# Patient Record
Sex: MALE | Race: BLACK OR AFRICAN AMERICAN | NOT HISPANIC OR LATINO | URBAN - METROPOLITAN AREA
[De-identification: names, ages, dates, MRNs, and addresses within clinical notes are randomized per-mention and may not be internally consistent; named-entity substitution may affect disease eponyms.]

---

## 2019-05-25 ENCOUNTER — EMERGENCY (EMERGENCY)
Facility: HOSPITAL | Age: 59
LOS: 1 days | Discharge: HOME | End: 2019-05-25
Attending: EMERGENCY MEDICINE | Admitting: EMERGENCY MEDICINE
Payer: MEDICAID

## 2019-05-25 VITALS
RESPIRATION RATE: 18 BRPM | TEMPERATURE: 98 F | OXYGEN SATURATION: 99 % | DIASTOLIC BLOOD PRESSURE: 67 MMHG | SYSTOLIC BLOOD PRESSURE: 140 MMHG | HEART RATE: 76 BPM

## 2019-05-25 LAB
ALBUMIN SERPL ELPH-MCNC: 3.9 G/DL — SIGNIFICANT CHANGE UP (ref 3.3–5)
ALP SERPL-CCNC: 87 U/L — SIGNIFICANT CHANGE UP (ref 40–120)
ALT FLD-CCNC: 24 U/L — SIGNIFICANT CHANGE UP (ref 4–41)
ANION GAP SERPL CALC-SCNC: 13 MMO/L — SIGNIFICANT CHANGE UP (ref 7–14)
APAP SERPL-MCNC: < 15 UG/ML — LOW (ref 15–25)
AST SERPL-CCNC: 23 U/L — SIGNIFICANT CHANGE UP (ref 4–40)
BASOPHILS # BLD AUTO: 0.02 K/UL — SIGNIFICANT CHANGE UP (ref 0–0.2)
BASOPHILS NFR BLD AUTO: 0.5 % — SIGNIFICANT CHANGE UP (ref 0–2)
BILIRUB SERPL-MCNC: 0.3 MG/DL — SIGNIFICANT CHANGE UP (ref 0.2–1.2)
BUN SERPL-MCNC: 19 MG/DL — SIGNIFICANT CHANGE UP (ref 7–23)
CALCIUM SERPL-MCNC: 9 MG/DL — SIGNIFICANT CHANGE UP (ref 8.4–10.5)
CHLORIDE SERPL-SCNC: 104 MMOL/L — SIGNIFICANT CHANGE UP (ref 98–107)
CO2 SERPL-SCNC: 21 MMOL/L — LOW (ref 22–31)
CREAT SERPL-MCNC: 0.96 MG/DL — SIGNIFICANT CHANGE UP (ref 0.5–1.3)
EOSINOPHIL # BLD AUTO: 0.06 K/UL — SIGNIFICANT CHANGE UP (ref 0–0.5)
EOSINOPHIL NFR BLD AUTO: 1.6 % — SIGNIFICANT CHANGE UP (ref 0–6)
ETHANOL BLD-MCNC: < 10 MG/DL — SIGNIFICANT CHANGE UP
GLUCOSE SERPL-MCNC: 71 MG/DL — SIGNIFICANT CHANGE UP (ref 70–99)
HCT VFR BLD CALC: 34.1 % — LOW (ref 39–50)
HGB BLD-MCNC: 11.3 G/DL — LOW (ref 13–17)
IMM GRANULOCYTES NFR BLD AUTO: 0.3 % — SIGNIFICANT CHANGE UP (ref 0–1.5)
LYMPHOCYTES # BLD AUTO: 1.48 K/UL — SIGNIFICANT CHANGE UP (ref 1–3.3)
LYMPHOCYTES # BLD AUTO: 39.9 % — SIGNIFICANT CHANGE UP (ref 13–44)
MCHC RBC-ENTMCNC: 26.6 PG — LOW (ref 27–34)
MCHC RBC-ENTMCNC: 33.1 % — SIGNIFICANT CHANGE UP (ref 32–36)
MCV RBC AUTO: 80.2 FL — SIGNIFICANT CHANGE UP (ref 80–100)
MONOCYTES # BLD AUTO: 0.39 K/UL — SIGNIFICANT CHANGE UP (ref 0–0.9)
MONOCYTES NFR BLD AUTO: 10.5 % — SIGNIFICANT CHANGE UP (ref 2–14)
NEUTROPHILS # BLD AUTO: 1.75 K/UL — LOW (ref 1.8–7.4)
NEUTROPHILS NFR BLD AUTO: 47.2 % — SIGNIFICANT CHANGE UP (ref 43–77)
NRBC # FLD: 0 K/UL — SIGNIFICANT CHANGE UP (ref 0–0)
PLATELET # BLD AUTO: 392 K/UL — SIGNIFICANT CHANGE UP (ref 150–400)
PMV BLD: 9.5 FL — SIGNIFICANT CHANGE UP (ref 7–13)
POTASSIUM SERPL-MCNC: 4.8 MMOL/L — SIGNIFICANT CHANGE UP (ref 3.5–5.3)
POTASSIUM SERPL-SCNC: 4.8 MMOL/L — SIGNIFICANT CHANGE UP (ref 3.5–5.3)
PROT SERPL-MCNC: 6.6 G/DL — SIGNIFICANT CHANGE UP (ref 6–8.3)
RBC # BLD: 4.25 M/UL — SIGNIFICANT CHANGE UP (ref 4.2–5.8)
RBC # FLD: 16 % — HIGH (ref 10.3–14.5)
SALICYLATES SERPL-MCNC: < 5 MG/DL — LOW (ref 15–30)
SODIUM SERPL-SCNC: 138 MMOL/L — SIGNIFICANT CHANGE UP (ref 135–145)
TSH SERPL-MCNC: 1.38 UIU/ML — SIGNIFICANT CHANGE UP (ref 0.27–4.2)
WBC # BLD: 3.71 K/UL — LOW (ref 3.8–10.5)
WBC # FLD AUTO: 3.71 K/UL — LOW (ref 3.8–10.5)

## 2019-05-25 PROCEDURE — 71046 X-RAY EXAM CHEST 2 VIEWS: CPT | Mod: 26

## 2019-05-25 PROCEDURE — 99284 EMERGENCY DEPT VISIT MOD MDM: CPT

## 2019-05-25 NOTE — PROVIDER CONTACT NOTE (OTHER) - SITUATION
Met with pt re: shelter.  Educated pt about shelter referral; pt refusing shelter referral. Met with pt re: shelter.  Educated pt about shelter referral; pt refusing shelter referral.  Called [facility] re: pt returning to program.

## 2019-05-25 NOTE — ED PROVIDER NOTE - PHYSICAL EXAMINATION
General: Patient awake alert NAD.   HEENT: normocephalic, atraumatic, EMOI, no scleral icterus.   Cardiac: RRR, S1, S2, no murmur, rubs, gallop.   LUNGS: CTA B/L no wheeze, rhonchi, rales.   Abdomen: soft NT, ND, no rebound no guarding, no CVA tenderness.   EXT: Moving all extremities, no edema.    Neuro: A&Ox3, gait normal, no focal neurological deficits, CN 2-12 grossly intact  Skin: warm, dry, no rash, no lesions

## 2019-05-25 NOTE — PROVIDER CONTACT NOTE (OTHER) - SITUATION
Met with pt again-informed him that respite is not an option.  Pt refusing shelter.  Stated he would go to various places to stay-motel, park, storage unit.

## 2019-05-25 NOTE — ED PROVIDER NOTE - CLINICAL SUMMARY MEDICAL DECISION MAKING FREE TEXT BOX
Lauren: 58 M, ADD, depression, anxiety, discharged from [facility] today, took his own Xanax 1 mg, then felt "woozy," tired, and confused. Says it's hard to take a deep breath. Seems unsteady on feet. Lauren: 58 M, ADD, depression, anxiety, discharged from a respite center today b/c was difficult toward other residents there, took his own Xanax 4-5 mg (to decrease his HR from recent crystal meth), then felt "woozy," tired, and confused. Unsteady on feet. Coherent. Has plans for a place to go. Await until Xanax wears off. Then dc.

## 2019-05-25 NOTE — ED ADULT NURSE NOTE - OBJECTIVE STATEMENT
break coverage rn: pt received to room 9 pt comes to ED by EMS from [facility] for confusion and being tired after taking 10 xanax  to relax pt is a&0x3 . pt denies SI/HI pt denies taking the pills to kill himself. pt has a 20 g in R AC by EMS pt VSS NAD labs were drawn and sent EDMD at bedside for eval awaiting further orders Will continue to monitor the pt

## 2019-05-25 NOTE — ED PROVIDER NOTE - ATTENDING CONTRIBUTION TO CARE
I performed a face-to-face evaluation of the patient and performed a history and physical examination. I agree with the history and physical examination.    Lauren: 58 M, ADD, depression, anxiety, discharged from a respite center today b/c was difficult toward other residents there, took his own Xanax 4-5 mg (to decrease his HR from recent crystal meth), then felt "woozy," tired, and confused. Unsteady on feet. Coherent. Has plans for a place to go. Await until Xanax wears off. Then dc.

## 2019-05-25 NOTE — PROVIDER CONTACT NOTE (OTHER) - ASSESSMENT
Medical team referred this case as pt has been medically cleared for discharge. Pt is 59 y/o male. Writer discussed the case with medical team and then spoke with the pt in ED # 9. pt stated “ I have no place to go but don’t want to go to any shelter ”. However pt adamantly refused to  to any shelter and requested to leave him alone. Writer educated the pt about [facility]’s assessment shelter [facility]. Address - [phone] ([address]), [address]: [address]. Pt was also educated and encouraged the pt go [facility]’s assessment shelter for safety concern and his own wellbeing. Pt is alert, ambulatory and oriented X4. Pt denies suicidal or homicidal thoughts during this time of discharge. Medical team was made aware and in agreement. All questions or concerns were addressed with Pt to satisfaction, No further SW intervention needed at this time for this visit.
SW contacted [facility] at [phone] to arrange transport.  Transport requested with [employer] at [phone].  Trip ID # [id number].

## 2019-05-25 NOTE — ED ADULT NURSE NOTE - CAS EDN DISCHARGE ASSESSMENT
Awake/pt ambulatory with steady gait, AAOx3, with all belongings in his possession at time of dc./Alert and oriented to person, place and time

## 2019-05-25 NOTE — ED ADULT NURSE NOTE - NSIMPLEMENTINTERV_GEN_ALL_ED
Implemented All Universal Safety Interventions:  [place] to call system. Call bell, personal items and telephone within reach. Instruct patient to call for assistance. Room bathroom lighting operational. Non-slip footwear when patient is off stretcher. Physically safe environment: no spills, clutter or unnecessary equipment. Stretcher in lowest position, wheels locked, appropriate side rails in place.

## 2019-05-25 NOTE — ED PROVIDER NOTE - NS ED ROS FT
GENERAL: No fever, chills  EYES: no vision changes, no discharge.   HEENT: no difficulty swallowing or speaking   CARDIAC: no chest pain/pressure, SOB, + palpitations,   PULMONARY: no cough, SOB  GI: no abdominal pain, n/v/d/c  : no dysuria, frequency, hematuria  SKIN: no rashes, lesions, vesicles  NEURO: no headache, lightheadedness.   MSK: No joint pain.
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
## 2019-05-25 NOTE — ED PROVIDER NOTE - OBJECTIVE STATEMENT
58M Pmhx of ADHD, Anxiety, Depression, poor historian, [facility] pw weakness. Pt endorse taking crystal meth 1-2 days ago and was still feeling palpitations today so took xanax 3-5 mgs. Endorse he feels unable to take full breath, no chest pain or pain anywhere else. PT states he was being discharged from [facility] today and his plan was to stay in his storage unit until he is scheduled to go to [place] in 3 days. 58M Pmhx of ADHD, Anxiety, Depression, coming from [facility] (facility at [facility]), pw weakness. Pt endorse taking crystal meth 1-2 days ago and was still feeling palpitations today so took xanax 3-5 mgs. Endorse he feels unable to take full breath, no chest pain or pain anywhere else. PT states he was being discharged from [facility] today and his plan was to stay in his storage unit until he is scheduled to go to [place] in 3 days. Unable to obtain further history.

## 2019-05-25 NOTE — ED ADULT TRIAGE NOTE - CHIEF COMPLAINT QUOTE
Patient brought to ER from [place] by EMS. Pt was being discharged from the facility and as per staff started to act confused. Pt admits to taking 4 Xanax last night and 1 today with no suicidal intention. Pt is alert and oriented times three and easily arousable however he appears sleepy. IVL to left AC 20 gauge and NS infusing.

## 2019-05-25 NOTE — PROVIDER CONTACT NOTE (OTHER) - BACKGROUND
ED SW note
Pt then states he will stay at a friend's place at [address] [place], [place] and is requesting transport to address.
As per Ese, pt was d/c today with plans for going to a rehab facility.  She reports that facility name is unknown.  She also states that pt is unable to return to respite for another 30 days.

## 2019-05-25 NOTE — ED ADULT NURSE REASSESSMENT NOTE - NS ED NURSE REASSESS COMMENT FT1
report received from DANIS Dillard, pt VS as noted, in NAD, resting comfortably, awaiting dispo, will continue to monitor pt.

## 2019-05-25 NOTE — ED ADULT NURSE NOTE - CHIEF COMPLAINT QUOTE
Patient brought to ER from [place] by EMS. Pt was being discharged from the facility and as per staff started to act confused. Pt admits to taking 4 Xanax last night and 1 today with no suicidal intention. Pt is alert and oriented times three and easily arousable however he appears sleepy. IVL to left AC 20 gauge and NS infusing.

## 2019-05-25 NOTE — ED PROVIDER NOTE - NSFOLLOWUPINSTRUCTIONS_ED_ALL_ED_FT
You were seen at [facility] ER for over dose of Xanax and you then sobered up. You had normal lab work.     Please refrain from over using Xanax especially with other drugs like alcohol or opiates.     Please follow-up with your primary care doctor in the next week.     You should follow-up at the [facility] in the next 1-2 weeks for further follow-up of care. Please call [phone] or [phone] for an appointment.     Please return for any other concerns or symptoms.

## 2019-05-26 VITALS
RESPIRATION RATE: 16 BRPM | OXYGEN SATURATION: 100 % | HEART RATE: 68 BPM | DIASTOLIC BLOOD PRESSURE: 93 MMHG | TEMPERATURE: 97 F | SYSTOLIC BLOOD PRESSURE: 131 MMHG

## 2019-07-26 VITALS
HEART RATE: 76 BPM | OXYGEN SATURATION: 100 % | RESPIRATION RATE: 18 BRPM | SYSTOLIC BLOOD PRESSURE: 107 MMHG | WEIGHT: 160.06 LBS | HEIGHT: 67 IN | DIASTOLIC BLOOD PRESSURE: 64 MMHG | TEMPERATURE: 98 F

## 2019-07-27 ENCOUNTER — INPATIENT (INPATIENT)
Facility: HOSPITAL | Age: 59
LOS: 2 days | Discharge: HOME | DRG: 885 | End: 2019-07-30
Attending: PSYCHIATRY & NEUROLOGY | Admitting: PSYCHIATRY & NEUROLOGY
Payer: MEDICAID

## 2019-07-27 DIAGNOSIS — F33.2 MAJOR DEPRESSIVE DISORDER, RECURRENT SEVERE WITHOUT PSYCHOTIC FEATURES: ICD-10-CM

## 2019-07-27 DIAGNOSIS — F43.10 POST-TRAUMATIC STRESS DISORDER, UNSPECIFIED: ICD-10-CM

## 2019-07-27 DIAGNOSIS — F15.10 OTHER STIMULANT ABUSE, UNCOMPLICATED: ICD-10-CM

## 2019-07-27 DIAGNOSIS — F41.9 ANXIETY DISORDER, UNSPECIFIED: ICD-10-CM

## 2019-07-27 DIAGNOSIS — R69 ILLNESS, UNSPECIFIED: ICD-10-CM

## 2019-07-27 PROBLEM — F32.9 MAJOR DEPRESSIVE DISORDER, SINGLE EPISODE, UNSPECIFIED: Chronic | Status: ACTIVE | Noted: 2019-05-25

## 2019-07-27 PROBLEM — F19.10 OTHER PSYCHOACTIVE SUBSTANCE ABUSE, UNCOMPLICATED: Chronic | Status: ACTIVE | Noted: 2019-05-25

## 2019-07-27 PROBLEM — F90.9 ATTENTION-DEFICIT HYPERACTIVITY DISORDER, UNSPECIFIED TYPE: Chronic | Status: ACTIVE | Noted: 2019-05-25

## 2019-07-27 LAB
ALBUMIN SERPL ELPH-MCNC: 4 G/DL — SIGNIFICANT CHANGE UP (ref 3.3–5)
ALP SERPL-CCNC: 94 U/L — SIGNIFICANT CHANGE UP (ref 40–120)
ALT FLD-CCNC: 18 U/L — SIGNIFICANT CHANGE UP (ref 10–45)
ANION GAP SERPL CALC-SCNC: 10 MMOL/L — SIGNIFICANT CHANGE UP (ref 5–17)
APAP SERPL-MCNC: <5 UG/ML — LOW (ref 10–30)
APPEARANCE UR: CLEAR — SIGNIFICANT CHANGE UP
AST SERPL-CCNC: 25 U/L — SIGNIFICANT CHANGE UP (ref 10–40)
BASOPHILS # BLD AUTO: 0.03 K/UL — SIGNIFICANT CHANGE UP (ref 0–0.2)
BASOPHILS NFR BLD AUTO: 0.5 % — SIGNIFICANT CHANGE UP (ref 0–2)
BILIRUB SERPL-MCNC: 0.2 MG/DL — SIGNIFICANT CHANGE UP (ref 0.2–1.2)
BILIRUB UR-MCNC: NEGATIVE — SIGNIFICANT CHANGE UP
BUN SERPL-MCNC: 21 MG/DL — SIGNIFICANT CHANGE UP (ref 7–23)
CALCIUM SERPL-MCNC: 9 MG/DL — SIGNIFICANT CHANGE UP (ref 8.4–10.5)
CHLORIDE SERPL-SCNC: 106 MMOL/L — SIGNIFICANT CHANGE UP (ref 96–108)
CO2 SERPL-SCNC: 25 MMOL/L — SIGNIFICANT CHANGE UP (ref 22–31)
COLOR SPEC: YELLOW — SIGNIFICANT CHANGE UP
CREAT SERPL-MCNC: 0.91 MG/DL — SIGNIFICANT CHANGE UP (ref 0.5–1.3)
DIFF PNL FLD: NEGATIVE — SIGNIFICANT CHANGE UP
EOSINOPHIL # BLD AUTO: 0.15 K/UL — SIGNIFICANT CHANGE UP (ref 0–0.5)
EOSINOPHIL NFR BLD AUTO: 2.7 % — SIGNIFICANT CHANGE UP (ref 0–6)
ETHANOL SERPL-MCNC: <10 MG/DL — SIGNIFICANT CHANGE UP (ref 0–10)
GLUCOSE SERPL-MCNC: 104 MG/DL — HIGH (ref 70–99)
GLUCOSE UR QL: NEGATIVE — SIGNIFICANT CHANGE UP
HCT VFR BLD CALC: 38.9 % — LOW (ref 39–50)
HGB BLD-MCNC: 11.9 G/DL — LOW (ref 13–17)
IMM GRANULOCYTES NFR BLD AUTO: 0.4 % — SIGNIFICANT CHANGE UP (ref 0–1.5)
KETONES UR-MCNC: ABNORMAL MG/DL
LEUKOCYTE ESTERASE UR-ACNC: NEGATIVE — SIGNIFICANT CHANGE UP
LYMPHOCYTES # BLD AUTO: 1.86 K/UL — SIGNIFICANT CHANGE UP (ref 1–3.3)
LYMPHOCYTES # BLD AUTO: 33.3 % — SIGNIFICANT CHANGE UP (ref 13–44)
MCHC RBC-ENTMCNC: 26 PG — LOW (ref 27–34)
MCHC RBC-ENTMCNC: 30.6 GM/DL — LOW (ref 32–36)
MCV RBC AUTO: 85.1 FL — SIGNIFICANT CHANGE UP (ref 80–100)
MONOCYTES # BLD AUTO: 0.72 K/UL — SIGNIFICANT CHANGE UP (ref 0–0.9)
MONOCYTES NFR BLD AUTO: 12.9 % — SIGNIFICANT CHANGE UP (ref 2–14)
NEUTROPHILS # BLD AUTO: 2.8 K/UL — SIGNIFICANT CHANGE UP (ref 1.8–7.4)
NEUTROPHILS NFR BLD AUTO: 50.2 % — SIGNIFICANT CHANGE UP (ref 43–77)
NITRITE UR-MCNC: NEGATIVE — SIGNIFICANT CHANGE UP
NRBC # BLD: 0 /100 WBCS — SIGNIFICANT CHANGE UP (ref 0–0)
PCP SPEC-MCNC: SIGNIFICANT CHANGE UP
PH UR: 6 — SIGNIFICANT CHANGE UP (ref 5–8)
PLATELET # BLD AUTO: 392 K/UL — SIGNIFICANT CHANGE UP (ref 150–400)
POTASSIUM SERPL-MCNC: 4.2 MMOL/L — SIGNIFICANT CHANGE UP (ref 3.5–5.3)
POTASSIUM SERPL-SCNC: 4.2 MMOL/L — SIGNIFICANT CHANGE UP (ref 3.5–5.3)
PROT SERPL-MCNC: 7.1 G/DL — SIGNIFICANT CHANGE UP (ref 6–8.3)
PROT UR-MCNC: NEGATIVE MG/DL — SIGNIFICANT CHANGE UP
RBC # BLD: 4.57 M/UL — SIGNIFICANT CHANGE UP (ref 4.2–5.8)
RBC # FLD: 17 % — HIGH (ref 10.3–14.5)
SALICYLATES SERPL-MCNC: <0.3 MG/DL — LOW (ref 2.8–20)
SODIUM SERPL-SCNC: 141 MMOL/L — SIGNIFICANT CHANGE UP (ref 135–145)
SP GR SPEC: 1.02 — SIGNIFICANT CHANGE UP (ref 1–1.03)
UROBILINOGEN FLD QL: 0.2 E.U./DL — SIGNIFICANT CHANGE UP
WBC # BLD: 5.58 K/UL — SIGNIFICANT CHANGE UP (ref 3.8–10.5)
WBC # FLD AUTO: 5.58 K/UL — SIGNIFICANT CHANGE UP (ref 3.8–10.5)

## 2019-07-27 PROCEDURE — 90792 PSYCH DIAG EVAL W/MED SRVCS: CPT | Mod: GT

## 2019-07-27 PROCEDURE — 99285 EMERGENCY DEPT VISIT HI MDM: CPT | Mod: 25

## 2019-07-27 PROCEDURE — G0427: CPT | Mod: GT

## 2019-07-27 PROCEDURE — 93010 ELECTROCARDIOGRAM REPORT: CPT

## 2019-07-27 RX ORDER — LISINOPRIL 2.5 MG/1
10 TABLET ORAL DAILY
Refills: 0 | Status: DISCONTINUED | OUTPATIENT
Start: 2019-07-27 | End: 2019-07-30

## 2019-07-27 RX ORDER — DIPHENHYDRAMINE HCL 50 MG
50 CAPSULE ORAL ONCE
Refills: 0 | Status: DISCONTINUED | OUTPATIENT
Start: 2019-07-27 | End: 2019-07-30

## 2019-07-27 RX ORDER — HALOPERIDOL DECANOATE 100 MG/ML
5 INJECTION INTRAMUSCULAR EVERY 8 HOURS
Refills: 0 | Status: DISCONTINUED | OUTPATIENT
Start: 2019-07-27 | End: 2019-07-30

## 2019-07-27 RX ORDER — HALOPERIDOL DECANOATE 100 MG/ML
5 INJECTION INTRAMUSCULAR ONCE
Refills: 0 | Status: DISCONTINUED | OUTPATIENT
Start: 2019-07-27 | End: 2019-07-30

## 2019-07-27 RX ORDER — FLUOXETINE HCL 10 MG
10 CAPSULE ORAL DAILY
Refills: 0 | Status: DISCONTINUED | OUTPATIENT
Start: 2019-07-27 | End: 2019-07-30

## 2019-07-27 RX ORDER — DIPHENHYDRAMINE HCL 50 MG
50 CAPSULE ORAL EVERY 6 HOURS
Refills: 0 | Status: DISCONTINUED | OUTPATIENT
Start: 2019-07-27 | End: 2019-07-30

## 2019-07-27 RX ADMIN — HALOPERIDOL DECANOATE 5 MILLIGRAM(S): 100 INJECTION INTRAMUSCULAR at 23:38

## 2019-07-27 RX ADMIN — Medication 50 MILLIGRAM(S): at 23:38

## 2019-07-27 NOTE — ED PROVIDER NOTE - ATTENDING CONTRIBUTION TO CARE
59 y/o M with PMHx of anxiety and ADHD presents to ER with complaints of feeling depressed and anxious for the last week. Patient reports that he can't sleep or eat, admits being non-complaint with medications. Also admits being admitted to hospital for overdosing on Xanax. Denies suicidal ideations or attempts. States that depression is sometimes "so overwhelming that he may become suicidal". Denies hearing voices, drinking, using street drugs, or any other acute complaints.    Agree with above HPI and PE  Psych clearance labs in ED  Pt seen and evaluated by psych who feels pt requires admission

## 2019-07-27 NOTE — ED BEHAVIORAL HEALTH ASSESSMENT NOTE - RISK ASSESSMENT
The patient is at acutely elevated risk for suicide. His risk factors include male gender,  marital status, limited social supports, undomiciled, current mood episode, recent suicidality, hopelessness, insomnia, anxiety, and active substance use. His protective factors include no acute suicidal ideation/intent/plan, no prior suicide attempts or history of non-suicidal self-injury, and no known direct access to firearms.    Acute Suicide Risk  (  ) High   ( X ) Moderate   (  ) Low   (  ) Unable to determine   Rationale: Please see above     Elevated Chronic Risk   ( X ) Yes ___________  Details: Please see above  (  ) No   ___________     Safety Plan   Details: The patient will be psychiatrically admitted to [facility].
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
## 2019-07-27 NOTE — ED PROVIDER NOTE - CLINICAL SUMMARY MEDICAL DECISION MAKING FREE TEXT BOX
57 yo male, homeless, with h/o anxiety, ADHD, PTSD, depression, in the ER due to worsening of his symptoms, admits being non-compliant with medications. pt seen and evaluated by tele-psychiatrist, admission for further management recommended. medically clear for admission.
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
## 2019-07-27 NOTE — ED BEHAVIORAL HEALTH ASSESSMENT NOTE - PRIMARY DX
Severe episode of recurrent major depressive disorder, without psychotic features Deferred diagnosis on axis II

## 2019-07-27 NOTE — ED PROVIDER NOTE - OBJECTIVE STATEMENT
59 y/o M with PMHx of anxiety and ADHD presents to ER with complaints of feeling depressed and anxious for the last week. Patient reports that he can't sleep or eat, admits being non-complaint with medications. Also admits being admitted to hospital for overdosing on Xanax. Denies suicidal ideations or attempts. States that depression is sometimes "so overwhelming that he may become suicidal". Denies hearing voices, drinking, using street drugs, or any other acute complaints.

## 2019-07-27 NOTE — ED BEHAVIORAL HEALTH NOTE - BEHAVIORAL HEALTH NOTE
Consult requested by EM Attending Dr. Licona at 2:43am. Telepsychiatry attempted to consult at 2:51am but unable to initiate due to patient not being on arm-length 1:1. ED staff educated to notify Telepsychiatry once 1:1 staff member is available.
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
## 2019-07-27 NOTE — ED BEHAVIORAL HEALTH ASSESSMENT NOTE - SUICIDE RISK FACTORS
Substance abuse/dependence/History of abuse/trauma/Global insomnia/Anhedonia/Hopelessness/Mood episode

## 2019-07-27 NOTE — ED BEHAVIORAL HEALTH ASSESSMENT NOTE - DESCRIPTION
Vital Signs Last 24 Hrs  T(C): 36.6 (26 Jul 2019 23:23), Max: 36.6 (26 Jul 2019 23:23)  T(F): 97.9 (26 Jul 2019 23:23), Max: 97.9 (26 Jul 2019 23:23)  HR: 76 (26 Jul 2019 23:23) (76 - 76)  BP: 107/64 (26 Jul 2019 23:23) (107/64 - 107/64)  BP(mean): --  RR: 18 (26 Jul 2019 23:23) (18 - 18)  SpO2: 100% (26 Jul 2019 23:23) (100% - 100%)    Pt in ED for ~2 hrs prior to Telepsych consult at 2:43. Per Triage RN, provider (verbally conveyed to primary RN) that Pt arrived at ~ 0:58 dressed appropriately for the weather, with fair hygiene/grooming. Pt was compliant with good cooperation for entire process of wanding/search/ and gowning and was escorted to the  area with no issues. Nothing of note in pt’s belongings. RN reports patient provided both urine specimen and routine bloodwork willingly. Pt requested food and drink. As per RN, pt. has not been agitated or irritable during his stay.  Pt’s eye contact is good, his speech volume/rate/articulation are normal and clear.  Pt’s affect is depressed and his thought process is logical and linear.  Pt. reports to RN/provider that he is depressed about finances and his living situation (pt. is homeless). Pt denies SI/HI and any delusions. RN states he is A/Ox 4 and does not appear cognitively impaired. Per RN, pt. has not required psychiatric or medical medications or any security interventions. Pt has no visitors at bedside. Pt is placed on a 1:1 observation and in a private room ready for telepsychiatry evaluation. HTN The patient reports that he is currently homeless living on the streets. He is  and does not have any children. The patient is currently unemployed.

## 2019-07-27 NOTE — ED BEHAVIORAL HEALTH ASSESSMENT NOTE - PSYCHIATRIC ISSUES AND PLAN (INCLUDE STANDING AND PRN MEDICATION)
Will start the patient on Prozac 10mg PO daily with plans to titrate the dose of this medication upward as clinically indicated and tolerated. Haldol/Benadryl PRNs for agitation as per [place].
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
## 2019-07-28 RX ADMIN — Medication 50 MILLIGRAM(S): at 21:20

## 2019-07-28 RX ADMIN — LISINOPRIL 10 MILLIGRAM(S): 2.5 TABLET ORAL at 13:07

## 2019-07-28 RX ADMIN — HALOPERIDOL DECANOATE 5 MILLIGRAM(S): 100 INJECTION INTRAMUSCULAR at 21:20
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
## 2019-07-28 NOTE — BEHAVIORAL HEALTH ASSESSMENT NOTE - RISK ASSESSMENT
The patient is at acutely elevated risk for suicide. His risk factors include male gender,  marital status, limited social supports, undomiciled, current mood episode, recent suicidality, hopelessness, insomnia, anxiety, and active substance use. His protective factors include no acute suicidal ideation/intent/plan, no prior suicide attempts or history of non-suicidal self-injury, and no known direct access to firearms.    Acute Suicide Risk  (  ) High   ( X ) Moderate   (  ) Low   (  ) Unable to determine   Rationale: Please see above     Elevated Chronic Risk   ( X ) Yes ___________  Details: Please see above  (  ) No   ___________     Safety Plan   Details: The patient will be psychiatrically admitted to [facility].
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
## 2019-07-28 NOTE — BEHAVIORAL HEALTH ASSESSMENT NOTE - GAIT / STATION
Normal gait / station Alternatives Discussed Intro (Do Not Add Period): I discussed alternative treatments to Mohs surgery and specifically discussed the risks and benefits of
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
## 2019-07-29 DIAGNOSIS — F19.94 OTHER PSYCHOACTIVE SUBSTANCE USE, UNSPECIFIED WITH PSYCHOACTIVE SUBSTANCE-INDUCED MOOD DISORDER: ICD-10-CM

## 2019-07-29 DIAGNOSIS — F32.0 MAJOR DEPRESSIVE DISORDER, SINGLE EPISODE, MILD: ICD-10-CM

## 2019-07-29 PROCEDURE — 99232 SBSQ HOSP IP/OBS MODERATE 35: CPT

## 2019-07-29 RX ADMIN — LISINOPRIL 10 MILLIGRAM(S): 2.5 TABLET ORAL at 11:11

## 2019-07-29 RX ADMIN — HALOPERIDOL DECANOATE 5 MILLIGRAM(S): 100 INJECTION INTRAMUSCULAR at 21:31

## 2019-07-29 RX ADMIN — Medication 50 MILLIGRAM(S): at 21:31

## 2019-07-29 NOTE — PROGRESS NOTE BEHAVIORAL HEALTH - NSBHFUPADDHPIFT_PSY_A_CORE
Patient is a 59 y/o AA male, undomiciled, single, on SSI, with previous history of ADHD, depression, anxiety (currently taking adderall and ambien), crystal meth use, no previous psychiatric hospitalizations, presenting for worsening symptoms of depression and anxiety. Upon evaluation today with writer, patient reports that he was recently kicked out of the storage unit he was living in with his possessions and had no money to pay the balance due to not being paid for recent work, which worsened his anxiety/depression and brought him to the ED. Patient reports very little sleep for the past 3-4 days, denies SI/HI, denies A/V hallucinations. Patient says his possessions will be auctioned off by the storage center on Tuesday 7/30 if he does not go to try and settle the balance, which he has money for now through SSI.

## 2019-07-29 NOTE — PROGRESS NOTE BEHAVIORAL HEALTH - NSBHCHARTREVIEWVS_PSY_A_CORE FT
Vital Signs Last 24 Hrs  T(C): 36.8 (29 Jul 2019 06:49), Max: 36.9 (28 Jul 2019 21:00)  T(F): 98.3 (29 Jul 2019 06:49), Max: 98.5 (28 Jul 2019 21:00)  HR: 64 (29 Jul 2019 06:49) (56 - 74)  BP: 147/75 (29 Jul 2019 06:49) (113/54 - 147/75)  BP(mean): --  RR: 17 (28 Jul 2019 21:00) (17 - 17)  SpO2: 100% (29 Jul 2019 06:49) (98% - 100%) Vital Signs Last 24 Hrs  T(C): 36.7 (29 Jul 2019 11:27), Max: 36.9 (28 Jul 2019 21:00)  T(F): 98 (29 Jul 2019 11:27), Max: 98.5 (28 Jul 2019 21:00)  HR: 65 (29 Jul 2019 11:27) (56 - 74)  BP: 126/82 (29 Jul 2019 11:27) (113/54 - 147/75)  BP(mean): --  RR: 19 (29 Jul 2019 11:27) (17 - 19)  SpO2: 98% (29 Jul 2019 11:27) (98% - 100%)

## 2019-07-29 NOTE — PROGRESS NOTE BEHAVIORAL HEALTH - NSBHADDHXPSYCHFT_PSY_A_CORE
No self reported hx of hospitalizations. Reports taking adderall and ambien on/off especially during periods of work as patient freelances as a . Patient claims to have appointment at a psychiatric clinic on [address] on 8/5/19.

## 2019-07-29 NOTE — PROGRESS NOTE BEHAVIORAL HEALTH - NSBHADDHXSUBSTFT_PSY_A_CORE
Crystal meth, last used 2 months ago. Recently discharged from [facility] this month (July 2019).

## 2019-07-29 NOTE — PROGRESS NOTE BEHAVIORAL HEALTH - SUMMARY
Patient is a 57 y/o AA male, undomiciled, single, on SSI, with previous history of ADHD, depression, anxiety (currently taking adderall and ambien), crystal meth use, no previous psychiatric hospitalizations, presenting for worsening symptoms of depression and anxiety. Upon evaluation, patient's mental status is largely unremarkable for any concerning features besides depressed mood. Patient otherwise appears future-oriented as he is concerned for his belongings and has normal reasoning and linear thought process about how to address his issues outside of the hospital. Patient reports using adderall and ambien as needed, especially during periods of work to meet deadlines, and recently worked on a painting project that he was not paid for which caused a significant amount of psychosocial stress. UTox is positive for amphetamines, either adderall or crystal meth though patient denies use for past 2 months, either substance likely exacerbated mood symptoms. 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 Patient is a 59 y/o AA male, undomiciled, single, on SSI, with previous history of ADHD, depression, anxiety (currently taking adderall and ambien), crystal meth use, no previous psychiatric hospitalizations, presenting for worsening symptoms of depression and anxiety. Upon evaluation, patient's mental status is largely unremarkable for any concerning features besides depressed mood. Patient otherwise appears future-oriented as he is concerned for his belongings and has normal reasoning and linear thought process about how to address his issues outside of the hospital. Patient reports using Adderall and ambien as needed, especially during periods of work to meet deadlines, and recently worked on a painting project that he was not paid for which caused a significant amount of psychosocial stress. UTox is positive for amphetamines, either Adderall or crystal meth though patient denies use for past 2 months, either substance likely exacerbated mood symptoms. 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

## 2019-07-29 NOTE — PROGRESS NOTE BEHAVIORAL HEALTH - NSBHADMITIPREASON_PSY_A_CORE
though patient denied SI, he reported feeling unsafe to leave the hospital/Danger to self; mental illness expected to respond to inpatient care

## 2019-07-29 NOTE — PROGRESS NOTE BEHAVIORAL HEALTH - NSBHFUPSTRENGTHS_PSY_A_CORE
Motivated and ready for change/Attempting to realize his/her potential/Intelligent/Creative/Able to manage surrounding demands/opportunities/Has vocational interests or hobbies/Cooperative with treatment/In good physical health/Good impulse control/Able to set and pursue goals

## 2019-07-30 VITALS
DIASTOLIC BLOOD PRESSURE: 74 MMHG | SYSTOLIC BLOOD PRESSURE: 119 MMHG | TEMPERATURE: 98 F | OXYGEN SATURATION: 96 % | RESPIRATION RATE: 18 BRPM | HEART RATE: 62 BPM

## 2019-07-30 LAB
CHOLEST SERPL-MCNC: 158 MG/DL — SIGNIFICANT CHANGE UP (ref 10–199)
HBA1C BLD-MCNC: 5.9 % — HIGH (ref 4–5.6)
HDLC SERPL-MCNC: 53 MG/DL — SIGNIFICANT CHANGE UP
LIPID PNL WITH DIRECT LDL SERPL: 92 MG/DL — SIGNIFICANT CHANGE UP
TOTAL CHOLESTEROL/HDL RATIO MEASUREMENT: 3 RATIO — LOW (ref 3.4–9.6)
TRIGL SERPL-MCNC: 64 MG/DL — SIGNIFICANT CHANGE UP (ref 10–149)

## 2019-07-30 PROCEDURE — 36415 COLL VENOUS BLD VENIPUNCTURE: CPT

## 2019-07-30 PROCEDURE — 83036 HEMOGLOBIN GLYCOSYLATED A1C: CPT

## 2019-07-30 PROCEDURE — 80307 DRUG TEST PRSMV CHEM ANLYZR: CPT

## 2019-07-30 PROCEDURE — 99285 EMERGENCY DEPT VISIT HI MDM: CPT | Mod: 25

## 2019-07-30 PROCEDURE — 93005 ELECTROCARDIOGRAM TRACING: CPT

## 2019-07-30 PROCEDURE — 80053 COMPREHEN METABOLIC PANEL: CPT

## 2019-07-30 PROCEDURE — 80061 LIPID PANEL: CPT

## 2019-07-30 PROCEDURE — 81003 URINALYSIS AUTO W/O SCOPE: CPT

## 2019-07-30 PROCEDURE — 85025 COMPLETE CBC W/AUTO DIFF WBC: CPT

## 2019-07-30 RX ADMIN — LISINOPRIL 10 MILLIGRAM(S): 2.5 TABLET ORAL at 10:02

## 2019-07-30 NOTE — PROGRESS NOTE BEHAVIORAL HEALTH - NSBHADMITIPREASON_PSY_A_CORE
Danger to self; mental illness expected to respond to inpatient care/though patient denied SI, he reported feeling unsafe to leave the hospital other reason

## 2019-07-30 NOTE — PROGRESS NOTE BEHAVIORAL HEALTH - PRN MEDICATIONS SINCE LAST EVAL
Called number and went straight to voicemail. Advised pt to call back if concerns. No return page received.
no
yes
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
## 2019-07-30 NOTE — PROGRESS NOTE BEHAVIORAL HEALTH - NSBHFUPINTERVALHXFT_PSY_A_CORE
Patient seen by writer. Patient continues to deny SI/HI, A/V hallucinations. Pt is future-oriented, concerned about retaining his belongings and getting paid for recent art work he had done. Patient is stable and ready for discharge.
Patient seen by writer. Patient denies SI/HI, A/V hallucinations, patient confirmed he did not present with any SI/HI in the ED. Patient reports symptomatic improvement in his mood. Denies any symptoms of anneliese with poor sleep for 3-4 days.

## 2019-07-30 NOTE — PROGRESS NOTE BEHAVIORAL HEALTH - PROBLEM SELECTOR PLAN 2
-patient refusing Prozac, symptoms alleviated without pharmacological intervention
-patient refusing Prozac, symptoms alleviated without pharmacological intervention

## 2019-07-30 NOTE — PROGRESS NOTE BEHAVIORAL HEALTH - NSBHCHARTREVIEWVS_PSY_A_CORE FT
Vital Signs Last 24 Hrs  T(C): 36.8 (30 Jul 2019 06:40), Max: 36.8 (30 Jul 2019 06:40)  T(F): 98.3 (30 Jul 2019 06:40), Max: 98.3 (30 Jul 2019 06:40)  HR: 62 (30 Jul 2019 06:40) (62 - 65)  BP: 119/74 (30 Jul 2019 06:40) (119/74 - 132/85)  BP(mean): --  RR: 18 (30 Jul 2019 06:40) (18 - 19)  SpO2: 96% (30 Jul 2019 06:40) (96% - 98%) Vital Signs Last 24 Hrs  T(C): 36.8 (30 Jul 2019 06:40), Max: 36.8 (30 Jul 2019 06:40)  T(F): 98.3 (30 Jul 2019 06:40), Max: 98.3 (30 Jul 2019 06:40)  HR: 62 (30 Jul 2019 06:40) (62 - 65)  BP: 119/74 (30 Jul 2019 06:40) (119/74 - 132/85)  BP(mean): --  RR: 18 (30 Jul 2019 06:40) (18 - 19)  SpO2: 96% (30 Jul 2019 06:40) (96% - 98%)

## 2019-07-30 NOTE — PROGRESS NOTE BEHAVIORAL HEALTH - NSBHCHARTREVIEWLAB_PSY_A_CORE FT
UA unremarkable  UTox positive for benzos and amphetamines  Hgb 11.9  CMP WNL
UA unremarkable  UTox positive for benzos and amphetamines  Hgb 11.9  CMP WNL
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
## 2019-07-30 NOTE — PROGRESS NOTE BEHAVIORAL HEALTH - PROBLEM SELECTOR PLAN 1
-patient refusing Prozac, symptoms alleviated without pharmacological intervention  -motivational interviewing concerning methamphetamine use  -patient offered substance use intervention but refused
-patient refusing Prozac, symptoms alleviated without pharmacological intervention

## 2019-07-30 NOTE — PROGRESS NOTE BEHAVIORAL HEALTH - PROBLEM SELECTOR PROBLEM 2
Current mild episode of major depressive disorder, unspecified whether recurrent
Current mild episode of major depressive disorder, unspecified whether recurrent

## 2019-07-30 NOTE — PROGRESS NOTE BEHAVIORAL HEALTH - SUMMARY
Patient is a 59 y/o AA male, undomiciled, single, on SSI, with previous history of ADHD, depression, anxiety (currently taking adderall and ambien), crystal meth use, no previous psychiatric hospitalizations, presenting for worsening symptoms of depression and anxiety. Upon evaluation, patient's mental status is largely unremarkable for any concerning features besides depressed mood. Patient otherwise appears future-oriented as he is concerned for his belongings and has normal reasoning and linear thought process about how to address his issues outside of the hospital. Patient reports using Adderall and ambien as needed, especially during periods of work to meet deadlines, and recently worked on a painting project that he was not paid for which caused a significant amount of psychosocial stress. UTox is positive for amphetamines, either Adderall or crystal meth though patient denies use for past 2 months, either substance likely exacerbated mood symptoms. 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

## 2019-08-02 DIAGNOSIS — F32.9 MAJOR DEPRESSIVE DISORDER, SINGLE EPISODE, UNSPECIFIED: ICD-10-CM

## 2019-08-02 DIAGNOSIS — F43.10 POST-TRAUMATIC STRESS DISORDER, UNSPECIFIED: ICD-10-CM

## 2019-08-02 DIAGNOSIS — I10 ESSENTIAL (PRIMARY) HYPERTENSION: ICD-10-CM

## 2019-08-02 DIAGNOSIS — F32.0 MAJOR DEPRESSIVE DISORDER, SINGLE EPISODE, MILD: ICD-10-CM

## 2019-08-02 DIAGNOSIS — F19.94 OTHER PSYCHOACTIVE SUBSTANCE USE, UNSPECIFIED WITH PSYCHOACTIVE SUBSTANCE-INDUCED MOOD DISORDER: ICD-10-CM

## 2019-08-02 DIAGNOSIS — F15.10 OTHER STIMULANT ABUSE, UNCOMPLICATED: ICD-10-CM

## 2019-09-20 ENCOUNTER — EMERGENCY (EMERGENCY)
Facility: HOSPITAL | Age: 59
LOS: 1 days | Discharge: HOME | End: 2019-09-20
Attending: EMERGENCY MEDICINE | Admitting: EMERGENCY MEDICINE
Payer: MEDICAID

## 2019-09-20 VITALS
RESPIRATION RATE: 18 BRPM | TEMPERATURE: 98 F | DIASTOLIC BLOOD PRESSURE: 104 MMHG | HEART RATE: 72 BPM | OXYGEN SATURATION: 100 % | SYSTOLIC BLOOD PRESSURE: 155 MMHG

## 2019-09-20 PROCEDURE — 99284 EMERGENCY DEPT VISIT MOD MDM: CPT

## 2019-09-20 PROCEDURE — 70450 CT HEAD/BRAIN W/O DYE: CPT | Mod: 26

## 2019-09-20 NOTE — ED ADULT NURSE NOTE - CHIEF COMPLAINT QUOTE
Pt. picked up from what EMS explains as a work shop for unresponsive pt. On arrival pt. responds to painful stimuli and admits to taking about 20 xanax pills. Pt. is unable to tell nurse dose, pt. falling asleep and not answering any questions in triage.

## 2019-09-20 NOTE — ED PROVIDER NOTE - OBJECTIVE STATEMENT
Pt BIB by EMS for AMS/somnolence following xanax overdose (approx 20tabs unk strength). no other complaints.  Placed in stretcher and quickly falls asleep.  Unable to cooperate with remainder of history.

## 2019-09-20 NOTE — ED PROVIDER NOTE - CARE PLAN
Principal Discharge DX:	Benzocaine overdose of undetermined intent, initial encounter Principal Discharge DX:	Benzodiazepine (tranquilizer) overdose, accidental or unintentional, initial encounter

## 2019-09-20 NOTE — ED ADULT NURSE NOTE - NSIMPLEMENTINTERV_GEN_ALL_ED
Implemented All Fall Risk Interventions:  [place] to call system. Call bell, personal items and telephone within reach. Instruct patient to call for assistance. Room bathroom lighting operational. Non-slip footwear when patient is off stretcher. Physically safe environment: no spills, clutter or unnecessary equipment. Stretcher in lowest position, wheels locked, appropriate side rails in place. Provide visual cue, wrist band, yellow gown, etc. Monitor gait and stability. Monitor for mental status changes and reorient to person, place, and time. Review medications for side effects contributing to fall risk. Reinforce activity limits and safety measures with patient and family.

## 2019-09-20 NOTE — ED PROVIDER NOTE - PRINCIPAL DIAGNOSIS
Benzocaine overdose of undetermined intent, initial encounter Benzodiazepine (tranquilizer) overdose, accidental or unintentional, initial encounter

## 2019-09-20 NOTE — ED ADULT TRIAGE NOTE - CHIEF COMPLAINT QUOTE
Pt. reports taking 20 xanax, pt. is very sleepy in triage and unable to answer most questions. Pt. picked up from what EMS explains as a work shop for unresponsive pt. On arrival pt. responds to painful stimuli and admits to taking about 20 xanax pills. Pt. is unable to tell nurse dose, pt. falling asleep and not answering any questions in triage.

## 2019-09-20 NOTE — ED PROVIDER NOTE - PATIENT PORTAL LINK FT
You can access the FollowMyHealth Patient Portal offered by [facility] by registering at the following website: [email or web address]. By joining [employer]’s FollowMyHealth portal, you will also be able to view your health information using other applications (apps) compatible with our system.

## 2019-09-20 NOTE — ED PROVIDER NOTE - CLINICAL SUMMARY MEDICAL DECISION MAKING FREE TEXT BOX
Clinically sober at time of discharge, no c/o pain or trauma. Declares no intent to harm himself. ambulating w/steady gait. d/c home with return precautions.

## 2019-09-20 NOTE — ED PROVIDER NOTE - PHYSICAL EXAMINATION
General: lethargic, arousable to touch  Head: NCAT  Eyes: PERRL  Heart: RRR  Lungs: CTAB  Abd: soft, NTND  Neuro: moves all 4 extremities equally  Skin: no e/o lacerations, abrasions, or ecchymoses

## 2019-09-21 VITALS
OXYGEN SATURATION: 99 % | HEART RATE: 82 BPM | RESPIRATION RATE: 18 BRPM | TEMPERATURE: 97 F | DIASTOLIC BLOOD PRESSURE: 82 MMHG | SYSTOLIC BLOOD PRESSURE: 137 MMHG

## 2019-09-21 NOTE — ED ADULT NURSE REASSESSMENT NOTE - NS ED NURSE REASSESS COMMENT FT1
Pt awakened requesting to use the restroom. Pt with unsteady gait, assisted by RN to and from the bathroom. Pt now asleep in bed in NAD.

## 2019-09-25 RX ORDER — AMPHETAMINE SULFATE 5 MG/1
30 TABLET ORAL
Qty: 0 | Refills: 0 | DISCHARGE

## 2019-09-25 RX ORDER — LISINOPRIL 2.5 MG/1
0 TABLET ORAL
Qty: 0 | Refills: 0 | DISCHARGE

## 2019-09-25 RX ORDER — ALPRAZOLAM 0.25 MG
0 TABLET ORAL
Qty: 0 | Refills: 0 | DISCHARGE

## 2019-09-27 DIAGNOSIS — T42.4X1A POISONING BY BENZODIAZEPINES, ACCIDENTAL (UNINTENTIONAL), INITIAL ENCOUNTER: ICD-10-CM

## 2020-03-27 ENCOUNTER — EMERGENCY (EMERGENCY)
Facility: HOSPITAL | Age: 60
LOS: 1 days | Discharge: HOME | End: 2020-03-27
Attending: EMERGENCY MEDICINE | Admitting: EMERGENCY MEDICINE
Payer: MEDICAID

## 2020-03-27 VITALS
HEART RATE: 75 BPM | RESPIRATION RATE: 18 BRPM | OXYGEN SATURATION: 97 % | WEIGHT: 145.06 LBS | TEMPERATURE: 98 F | HEIGHT: 68 IN | DIASTOLIC BLOOD PRESSURE: 86 MMHG | SYSTOLIC BLOOD PRESSURE: 129 MMHG

## 2020-03-27 DIAGNOSIS — Z79.899 OTHER LONG TERM (CURRENT) DRUG THERAPY: ICD-10-CM

## 2020-03-27 DIAGNOSIS — R07.81 PLEURODYNIA: ICD-10-CM

## 2020-03-27 DIAGNOSIS — F15.10 OTHER STIMULANT ABUSE, UNCOMPLICATED: ICD-10-CM

## 2020-03-27 DIAGNOSIS — I10 ESSENTIAL (PRIMARY) HYPERTENSION: ICD-10-CM

## 2020-03-27 PROBLEM — R46.89 OTHER SYMPTOMS AND SIGNS INVOLVING APPEARANCE AND BEHAVIOR: Chronic | Status: ACTIVE | Noted: 2019-09-24

## 2020-03-27 LAB — TROPONIN I SERPL-MCNC: <0.017 NG/ML — LOW (ref 0.02–0.06)

## 2020-03-27 PROCEDURE — 99285 EMERGENCY DEPT VISIT HI MDM: CPT

## 2020-03-27 PROCEDURE — 99053 MED SERV 10PM-8AM 24 HR FAC: CPT

## 2020-03-27 PROCEDURE — 93010 ELECTROCARDIOGRAM REPORT: CPT

## 2020-03-27 RX ORDER — ACETAMINOPHEN 500 MG
650 TABLET ORAL ONCE
Refills: 0 | Status: COMPLETED | OUTPATIENT
Start: 2020-03-27 | End: 2020-03-27

## 2020-03-27 RX ADMIN — Medication 650 MILLIGRAM(S): at 05:14

## 2020-03-27 NOTE — ED PROVIDER NOTE - PATIENT PORTAL LINK FT
You can access the FollowMyHealth Patient Portal offered by [facility] by registering at the following website: [email or web address]. By joining [employer]’s FollowMyHealth portal, you will also be able to view your health information using other applications (apps) compatible with our system.

## 2020-03-27 NOTE — ED PROVIDER NOTE - CLINICAL SUMMARY MEDICAL DECISION MAKING FREE TEXT BOX
WIll do EKG, troponin. Concern for malingering as pt requested food as soon as he arrived. low suspicion for ACS, resting comfortably while waiting on results

## 2020-03-27 NOTE — ED ADULT NURSE NOTE - OBJECTIVE STATEMENT
Pt c/o chest pain that radiates to left ribcage and patient denies cough. He states that this happens a lot and it comes and goes. He stated that the pain is dull in nature.  He also stated that he walked a lot today and has not slept in 5 days. He is homeless and has no were to go.

## 2020-03-27 NOTE — ED PROVIDER NOTE - OBJECTIVE STATEMENT
59 male pt, hx of HTN, questionable compliance w medication, allergic to Lisinopril. Presents w chest pain, substernal, mild, pressure, tyrone 1-2 days in the setting of using crystal meth recently. Hx of visits for similar in the past. no cough, no fever, no chills, no sob.

## 2021-06-24 NOTE — ED ADULT NURSE NOTE - CAS EDN DISCHARGE INTERVENTIONS
Called pt let him know that I submitted a pa for his vraylar and would keep him updated on the status, patient understands   
PATIENT CALLING STATING THAT THE Cariprazine HCl (Vraylar) 1.5 MG capsule capsule WILL COST  TO BE ABLE TO PICK IT UP. HE WOULD LIKE TO KNOW IF THERE WAS SOMETHING TO HELP PAY FOR THIS. PATIENT ALSO STATED HE IS OPEN TO HAVE IT SWITCHED TO SOMETHING ELSE.    PLEASE ADVISE  [phone]   
IV discontinued, cath removed intact

## 2021-11-18 NOTE — PROGRESS NOTE BEHAVIORAL HEALTH - NS ED [facility] MSE SPEECH SPONTANEITY
Normal
Normal
Positioning (Leave Blank If You Do Not Want): The patient was placed in a comfortable position exposing the surgical site.

## 2022-03-08 NOTE — ED ADULT TRIAGE NOTE - WEIGHT IN LBS
Date: 2022    RE:Sheryl Whatley  : 1977      To whom it may concern,    Simran Keenan is currently under my medical care for anxiety and depression, which she is currently taking medication for and had recently ran out of  If you have any questions concerning this please contact our office at [phone]         Sincerely,      Radha Suarez MD [id number]

## 2022-04-13 NOTE — ED PROVIDER NOTE - DISPOSITION TYPE
The following information and instructions were given:    Do not eat, drink, smoke or chew gum after midnight the night before surgery. This includes no mints.  Take all routine, prescribed medications including heart and blood pressure medicines with a sip of water unless otherwise instructed by your physician.   Do NOT take diabetic medication unless instructed by your physician.    DO NOT shave for two days before your procedure.  Do not wear makeup.      DO NOT wear fingernail polish (gel/regular) and/or acrylic/artificial nails on the day of surgery.   If you had a recent manicure and would rather not remove polish or artificial nails, the minimum requirement is that the polish/artificial nails must be removed from the middle finger on each hand.      If you are having surgery/procedure on an upper extremity, fingernail polish/artificial fingernails must be removed for surgery.  NO EXCEPTIONS.      If you are having surgery/procedure on a lower extremity, toenail polish on both extremities must be removed for surgery.  NO EXCEPTIONS.    Remove all jewelry (advise to go to jeweler if unable to remove).  Jewelry, especially rings, can no longer be taped for surgery.    Leave anything you consider valuable at home.    Leave your suitcase in the car until after your surgery.    Bring the following with you the day of your procedure (when applicable):       -Picture ID and insurance cards       -Co-pay/deductible required by insurance       -Medications in the original bottles (not a list) including all over-the-counter medications if not brought to PAT       -Copy of advance directive, living will or power of  documents if not brought to PAT       -CPAP or BIPAP mask and tubing (do not bring machine)       -Skin prep instruction(s) sheet       -PAT Pass    Education booklet (when applicable), brochure, handout or binder related to procedure given to patient.  Education booklet also includes general  information related to their recovery that mentions signs and symptoms of infection and when to call the doctor.    When applicable, an ERAS handout was given to patient.    Respirex use and pneumonia prevention education provided in Pre Admission Testing general education video.    Signs and Symptoms of infection discussed with patient in Pre Admission Testing. Information related to infection and hand hygiene mentioned in Pre Admission Testing general education video. Patient instructed to call their doctor if any of the following symptoms are noted during recovery:  Fever of 100.4 F or higher, incision that is warm or has increasing bleeding, redness or drainage.    DVT Prevention instructions given in general education video presentation during Pre Admission Testing appointment that stress the importance of ambulation to improve blood circulation.  Also encouraged patient to perform foot exercises when in bed and application of a sequential device may be applied to lower extremities to improve circulation.      Please apply Chlorhexidine wipes to surgical area (if instructed) the night before procedure and the AM of procedure and document date/time of applications on skin prep instruction sheet.         DISCHARGE

## 2022-12-28 NOTE — ED PROVIDER NOTE - DATE/TIME 1
----- Message from Briana Swain 25-May-2019 18:00

## 2024-04-19 NOTE — BEHAVIORAL HEALTH ASSESSMENT NOTE - SECONDARY DX3
I have personally provided the amount of critical care time documented below concurrently with the resident/fellow.  This time excludes time spent on separate procedures and time spent teaching. I have reviewed the resident’s / fellow’s documentation and I agree with the history, exam, and assessment and plan of care. Methamphetamine abuse

## 2024-08-12 NOTE — BEHAVIORAL HEALTH ASSESSMENT NOTE - NS ED [facility] MED ROS RESPIRATORY
Patient does not appear to be in asthma exacerbation.  Well controlled on home regimen    PLAN  Continue home Advair and Singulair,   Continue albuterol as needed.   No complaints

## 2025-03-11 NOTE — ED ADULT TRIAGE NOTE - NS ED NURSE DIRECT TO ROOM YN
Pt ambulatory to the ED with palpitations, dull ache in L side of chest intermittently for the past two weeks. Has been getting notifications from apple watch stating HR is high in the 120s while at rest periodically. Recent long car ride.   
No